# Patient Record
Sex: FEMALE | Race: BLACK OR AFRICAN AMERICAN | NOT HISPANIC OR LATINO | URBAN - METROPOLITAN AREA
[De-identification: names, ages, dates, MRNs, and addresses within clinical notes are randomized per-mention and may not be internally consistent; named-entity substitution may affect disease eponyms.]

---

## 2024-08-19 ENCOUNTER — EMERGENCY (EMERGENCY)
Facility: HOSPITAL | Age: 49
LOS: 1 days | Discharge: HOME | End: 2024-08-19
Attending: STUDENT IN AN ORGANIZED HEALTH CARE EDUCATION/TRAINING PROGRAM | Admitting: EMERGENCY MEDICINE
Payer: COMMERCIAL

## 2024-08-19 VITALS
SYSTOLIC BLOOD PRESSURE: 153 MMHG | WEIGHT: 179.9 LBS | RESPIRATION RATE: 18 BRPM | DIASTOLIC BLOOD PRESSURE: 79 MMHG | HEIGHT: 62 IN | OXYGEN SATURATION: 99 % | HEART RATE: 63 BPM | TEMPERATURE: 98 F

## 2024-08-19 VITALS
OXYGEN SATURATION: 100 % | HEART RATE: 60 BPM | TEMPERATURE: 99 F | DIASTOLIC BLOOD PRESSURE: 79 MMHG | SYSTOLIC BLOOD PRESSURE: 152 MMHG | RESPIRATION RATE: 18 BRPM

## 2024-08-19 LAB
ALBUMIN SERPL ELPH-MCNC: 3.8 G/DL — SIGNIFICANT CHANGE UP (ref 3.3–5)
ALP SERPL-CCNC: 55 U/L — SIGNIFICANT CHANGE UP (ref 40–120)
ALT FLD-CCNC: 15 U/L — SIGNIFICANT CHANGE UP (ref 4–33)
ANION GAP SERPL CALC-SCNC: 14 MMOL/L — SIGNIFICANT CHANGE UP (ref 7–14)
APPEARANCE UR: ABNORMAL
AST SERPL-CCNC: 31 U/L — SIGNIFICANT CHANGE UP (ref 4–32)
BACTERIA # UR AUTO: ABNORMAL /HPF
BASOPHILS # BLD AUTO: 0.02 K/UL — SIGNIFICANT CHANGE UP (ref 0–0.2)
BASOPHILS NFR BLD AUTO: 0.4 % — SIGNIFICANT CHANGE UP (ref 0–2)
BILIRUB SERPL-MCNC: 0.4 MG/DL — SIGNIFICANT CHANGE UP (ref 0.2–1.2)
BILIRUB UR-MCNC: NEGATIVE — SIGNIFICANT CHANGE UP
BUN SERPL-MCNC: 11 MG/DL — SIGNIFICANT CHANGE UP (ref 7–23)
CALCIUM SERPL-MCNC: 9.4 MG/DL — SIGNIFICANT CHANGE UP (ref 8.4–10.5)
CHLORIDE SERPL-SCNC: 102 MMOL/L — SIGNIFICANT CHANGE UP (ref 98–107)
CO2 SERPL-SCNC: 19 MMOL/L — LOW (ref 22–31)
COLOR SPEC: YELLOW — SIGNIFICANT CHANGE UP
CREAT SERPL-MCNC: 0.58 MG/DL — SIGNIFICANT CHANGE UP (ref 0.5–1.3)
DIFF PNL FLD: NEGATIVE — SIGNIFICANT CHANGE UP
EGFR: 111 ML/MIN/1.73M2 — SIGNIFICANT CHANGE UP
EOSINOPHIL # BLD AUTO: 0.06 K/UL — SIGNIFICANT CHANGE UP (ref 0–0.5)
EOSINOPHIL NFR BLD AUTO: 1.1 % — SIGNIFICANT CHANGE UP (ref 0–6)
GLUCOSE SERPL-MCNC: 107 MG/DL — HIGH (ref 70–99)
GLUCOSE UR QL: NEGATIVE MG/DL — SIGNIFICANT CHANGE UP
HCG SERPL-ACNC: <1 MIU/ML — SIGNIFICANT CHANGE UP
HCT VFR BLD CALC: 37.4 % — SIGNIFICANT CHANGE UP (ref 34.5–45)
HGB BLD-MCNC: 12.3 G/DL — SIGNIFICANT CHANGE UP (ref 11.5–15.5)
IANC: 2.38 K/UL — SIGNIFICANT CHANGE UP (ref 1.8–7.4)
IMM GRANULOCYTES NFR BLD AUTO: 0.2 % — SIGNIFICANT CHANGE UP (ref 0–0.9)
KETONES UR-MCNC: NEGATIVE MG/DL — SIGNIFICANT CHANGE UP
LEUKOCYTE ESTERASE UR-ACNC: NEGATIVE — SIGNIFICANT CHANGE UP
LIDOCAIN IGE QN: 25 U/L — SIGNIFICANT CHANGE UP (ref 7–60)
LYMPHOCYTES # BLD AUTO: 2.48 K/UL — SIGNIFICANT CHANGE UP (ref 1–3.3)
LYMPHOCYTES # BLD AUTO: 44.8 % — HIGH (ref 13–44)
MCHC RBC-ENTMCNC: 30.1 PG — SIGNIFICANT CHANGE UP (ref 27–34)
MCHC RBC-ENTMCNC: 32.9 GM/DL — SIGNIFICANT CHANGE UP (ref 32–36)
MCV RBC AUTO: 91.7 FL — SIGNIFICANT CHANGE UP (ref 80–100)
MONOCYTES # BLD AUTO: 0.58 K/UL — SIGNIFICANT CHANGE UP (ref 0–0.9)
MONOCYTES NFR BLD AUTO: 10.5 % — SIGNIFICANT CHANGE UP (ref 2–14)
NEUTROPHILS # BLD AUTO: 2.38 K/UL — SIGNIFICANT CHANGE UP (ref 1.8–7.4)
NEUTROPHILS NFR BLD AUTO: 43 % — SIGNIFICANT CHANGE UP (ref 43–77)
NITRITE UR-MCNC: NEGATIVE — SIGNIFICANT CHANGE UP
NRBC # BLD: 0 /100 WBCS — SIGNIFICANT CHANGE UP (ref 0–0)
NRBC # FLD: 0 K/UL — SIGNIFICANT CHANGE UP (ref 0–0)
PH UR: 7.5 — SIGNIFICANT CHANGE UP (ref 5–8)
PLATELET # BLD AUTO: 307 K/UL — SIGNIFICANT CHANGE UP (ref 150–400)
POTASSIUM SERPL-MCNC: 4.9 MMOL/L — SIGNIFICANT CHANGE UP (ref 3.5–5.3)
POTASSIUM SERPL-SCNC: 4.9 MMOL/L — SIGNIFICANT CHANGE UP (ref 3.5–5.3)
PROT SERPL-MCNC: 7.7 G/DL — SIGNIFICANT CHANGE UP (ref 6–8.3)
PROT UR-MCNC: NEGATIVE MG/DL — SIGNIFICANT CHANGE UP
RBC # BLD: 4.08 M/UL — SIGNIFICANT CHANGE UP (ref 3.8–5.2)
RBC # FLD: 12.6 % — SIGNIFICANT CHANGE UP (ref 10.3–14.5)
RBC CASTS # UR COMP ASSIST: 4 /HPF — SIGNIFICANT CHANGE UP (ref 0–4)
SODIUM SERPL-SCNC: 135 MMOL/L — SIGNIFICANT CHANGE UP (ref 135–145)
SP GR SPEC: 1.01 — SIGNIFICANT CHANGE UP (ref 1–1.03)
SQUAMOUS # UR AUTO: 11 /HPF — HIGH (ref 0–5)
UROBILINOGEN FLD QL: 0.2 MG/DL — SIGNIFICANT CHANGE UP (ref 0.2–1)
WBC # BLD: 5.53 K/UL — SIGNIFICANT CHANGE UP (ref 3.8–10.5)
WBC # FLD AUTO: 5.53 K/UL — SIGNIFICANT CHANGE UP (ref 3.8–10.5)
WBC UR QL: 8 /HPF — HIGH (ref 0–5)

## 2024-08-19 PROCEDURE — 99283 EMERGENCY DEPT VISIT LOW MDM: CPT

## 2024-08-19 PROCEDURE — 99285 EMERGENCY DEPT VISIT HI MDM: CPT

## 2024-08-19 PROCEDURE — 74177 CT ABD & PELVIS W/CONTRAST: CPT | Mod: 26,MC

## 2024-08-19 RX ORDER — KETOROLAC TROMETHAMINE 10 MG
30 TABLET ORAL ONCE
Refills: 0 | Status: DISCONTINUED | OUTPATIENT
Start: 2024-08-19 | End: 2024-08-19

## 2024-08-19 RX ADMIN — Medication 30 MILLIGRAM(S): at 10:40

## 2024-08-19 NOTE — ED PROVIDER NOTE - CLINICAL SUMMARY MEDICAL DECISION MAKING FREE TEXT BOX
Patient's had abdominal pain for years.  On exam she has focal left lower quadrant tenderness palpation.  Vitals are normal no fever well-appearing.  Plan CT abdomen pelvis evaluate for diverticulitis also she had dysuria so we will check urine urine culture.  Anticipate discharge.  She has a GI doctor does not member the name but she said she will follow-up with them.

## 2024-08-19 NOTE — ED PROVIDER NOTE - GASTROINTESTINAL, MLM
difficulty in turns/increased time in double stance/decreased step length/decreased stride length/increased stride width Abdomen soft,  Left lower quadrant tenderness palpation, no guarding.

## 2024-08-19 NOTE — CONSULT NOTE ADULT - ASSESSMENT
Patient is a 48yo  LMP / presenting with 6 years of chronic lower abdominal pain that she experiences on a daily basis, worsens with menses and began shortly after her  section 7 years ago. On evaluation in the ED, patient's vital signs wnl, labs unremarkable and CTAP significant for a 1.6x1.2cm fluid collection in left rectus muscles possibly suggestive of endometrial implant vs abscess for which GYN was consulted.    - No acute surgical intervention indicated at this time.  - Patient to be coordinated for outpatient GYN appointment with MARIA ESTHER ACU:     [facility]  Oncology [facility], [place] [address]  (P) [phone]  (F) [phone]     - At outpatient visit, an MRI may be ordered to better investigate the abdominal wall mass for possible endometriosis implant. Patient also instructed to bring copy of prior CT scan to outpatient appt for comparison.  - Patient encouraged to schedule outpatient GI evaluation  - No GYN contraindication to discharge to home    D/w GYN attending, Dr. Stephanie Ronquillo PGY3

## 2024-08-19 NOTE — CONSULT NOTE ADULT - CONSULT REQUESTED BY NAME
Seek emergent care for trouble breathing, shortness of breath, wheezing, or trouble swallowing.   Monitor blood pressure daily and call/follow up with PCP for BP >155/95.     Take claritin daily.    ED

## 2024-08-19 NOTE — ED PROVIDER NOTE - PROGRESS NOTE DETAILS
Jarad WALLACE: Patient signed out to me. Per GYN, plan for d/c with outpatient follow up with [facility]. Call [phone].

## 2024-08-19 NOTE — ED ADULT NURSE NOTE - OBJECTIVE STATEMENT
Pt awake and alert, denies pertinent PMH presenting to ED for diffuse abdominal pain but slightly worse on left side. Pt states she has had pain on and off for 2 years. Pt has had previous workup with unremarkable findings, Pt well appearing. 20g IV placed in right hand, labs sent. Pt awaiting imaging and dispo.

## 2024-08-19 NOTE — CONSULT NOTE ADULT - SUBJECTIVE AND OBJECTIVE BOX
KIMMY SALDANA  49y  Female [id number]    HPI: Patient is a 50yo  LMP  presenting with 6 years of chronic lower abdominal pain. Pt reports daily abdominal pain that is worsened by movement, laying on her abdomen, bending over. Denies association with meals, denies associated nausea, vomiting, loss of appetite, heartburn, constipation, bloating. She has regular monthly menses and states her pain worsens during menstruation but is still present in between periods. Pain is relieved by OTC medication, often takes Aleve especially during menses. Pt reports this chronic pain began shortly after her  section in . She was worked up in [facility] ED last year and underwent CT scan. Per patient, it was normal. She has the CT results printed out at home, does not have with her in person for review. She follows closely with PCP for T2DM and HTN who is aware of this chronic pain and referred her to GI. Pt temporarily lost her health insurance and has therefore not established care with GI.    Name of GYN Physician: pt cannot recall name  OBHx: C/s x1 (2017), SAB x5  GynHx: Known fibroids, denies cysts, endometriosis, STI's, Abnormal pap smears   PMH: T2DM, HTN  PSH: C/s x1  Meds: Metformin, Irbesartan 75mg  Allx: NKDA  Social History: Denies smoking use, drug use, alcohol use    Vital Signs Last 24 Hrs  T(C): 37.2 (19 Aug 2024 17:11), Max: 37.2 (19 Aug 2024 17:11)  T(F): 98.9 (19 Aug 2024 17:11), Max: 98.9 (19 Aug 2024 17:11)  HR: 60 (19 Aug 2024 17:11) (60 - 63)  BP: 152/79 (19 Aug 2024 17:11) (152/79 - 153/79)  RR: 18 (19 Aug 2024 17:11) (18 - 18)  SpO2: 100% (19 Aug 2024 17:11) (99% - 100%)    Parameters below as of 19 Aug 2024 17:11  Patient On (Oxygen Delivery Method): room air    Physical Exam:   General: sitting comfortably in chair, NAD   HEENT: neck supple, full ROM  CV: extremities well perfused  Lungs: normal respiratory effort on room air  Abd: Soft, nondistended. Mild tenderness to deep palpation of suprapubic region in midline and patient right. No rebound tenderness. No palpable masses.  Ext: non-tender b/l, no edema     LABS:                       12.3   5.53  )-----------( 307      ( 19 Aug 2024 10:27 )             37.4         135  |  102  |  11  ----------------------------<  107<H>  4.9   |  19<L>  |  0.58    Ca    9.4      19 Aug 2024 10:27    TPro  7.7  /  Alb  3.8  /  TBili  0.4  /  DBili  x   /  AST  31  /  ALT  15  /  AlkPhos  55      I&O's Detail    Urinalysis Basic - ( 19 Aug 2024 10:27 )    Color: Yellow / Appearance: Cloudy / S.013 / pH: x  Gluc: 107 mg/dL / Ketone: Negative mg/dL  / Bili: Negative / Urobili: 0.2 mg/dL   Blood: x / Protein: Negative mg/dL / Nitrite: Negative   Leuk Esterase: Negative / RBC: 4 /HPF / WBC 8 /HPF   Sq Epi: x / Non Sq Epi: 11 /HPF / Bacteria: Many /HPF    RADIOLOGY & ADDITIONAL STUDIES:  < from: CT Abdomen and Pelvis w/ IV Cont (24 @ 11:16) >  ACC: [id number] EXAM:  CT ABDOMEN AND PELVIS IC   ORDERED BY: AKIL LOZOYA     PROCEDURE DATE:  2024      INTERPRETATION:  CLINICAL INFORMATION: Left lower quadrant tenderness.   Concern for diverticulosis. Surgical history . Says she started   getting abdominal pain after her  years ago    COMPARISON: None.    CONTRAST/COMPLICATIONS:  IV Contrast: Omnipaque 350  90 cc administered   10 cc discarded  Oral Contrast: NONE  Complications: None reported at time of study completion    PROCEDURE:  CT of the Abdomen and Pelvis was performed.  Sagittal and coronal reformats were performed.    FINDINGS:  LOWER CHEST: Within normal limits.    LIVER: Within normal limits.  BILE DUCTS: Normal caliber.  GALLBLADDER: Withinnormal limits.  SPLEEN: Within normal limits.  PANCREAS: Within normal limits.  ADRENALS: Within normal limits.  KIDNEYS/URETERS: Within normal limits.    BLADDER: Within normal limits.  REPRODUCTIVE ORGANS: Leiomyomatous uterus.    BOWEL: No bowel obstruction. Appendix is normal. Diverticulosis without   evidence of diverticulitis.  PERITONEUM/RETROPERITONEUM: Within normal limits.  VESSELS: Within normal limits.  LYMPH NODES: No lymphadenopathy.  ABDOMINAL WALL: Inflammatory changes along the  scar. A 1.6 x   1.2 cm fluid collection in the left rectus.  BONES: Within normal limits.    IMPRESSION:  Inflammatory changes along the  scar. A 1.6 x 1.2 cm fluid   collection in the left rectus, possibly endometrial implant or abscess.    --- End of Report ---    MARGOT JUAREZ MD; Resident Radiologist  This document has been electronically signed.  ERIC BELTRÁN MD; Attending Radiologist  This document has been electronically signed. Aug 19 2024  1:04PM    < end of copied text >

## 2024-08-19 NOTE — ED PROVIDER NOTE - NSFOLLOWUPINSTRUCTIONS_ED_ALL_ED_FT
You were seen here in the emergency department for evaluation of abdominal pain.  Your workup included blood work and a CT scan.  Your CT scan showed:      IMPRESSION:  Inflammatory changes along the  scar. A 1.6 x 1.2 cm fluid   collection in the left rectus, possibly endometrial implant or abscess.     You were evaluated by OB/GYN.  The recommendation is that you follow-up in clinic.  The clinic information is as follows:    [facility]  [address] [place] [address], [place] [address] [phone]     If you develop new symptoms of concern such as heavy bleeding, fevers, worsening abdominal pain, return to the emergency department for evaluation.  Otherwise, please follow-up with the clinic as discussed.  Please bring all prior imaging as requested.

## 2024-08-19 NOTE — ED PROVIDER NOTE - PATIENT PORTAL LINK FT
You can access the FollowMyHealth Patient Portal offered by [facility] by registering at the following website: [email or web address]. By joining [employer]’s FollowMyHealth portal, you will also be able to view your health information using other applications (apps) compatible with our system.

## 2024-08-19 NOTE — ED PROVIDER NOTE - OBJECTIVE STATEMENT
49-year-old female presents to the ED with years of bilateral and also diffuse abdominal pain worsening this morning.  Nonradiating.  Associated with dysuria.   No nausea vomiting or diarrhea.  She is occasionally constipated.  She said drinking milk makes her feel worse.  She was evaluated for this once before and on the emergency room which did not yield diagnosis.  She denies fevers chills.  Says Tylenol does not work for the pain but Advil helps her.  Surgical history .  Says she started getting abdominal pain after her  years ago.  Non-smoker nondrinker.  Medical history diabetes and hypertension on metformin.

## 2024-09-03 ENCOUNTER — RESULT REVIEW (OUTPATIENT)
Age: 49
End: 2024-09-03

## 2024-09-04 ENCOUNTER — OUTPATIENT (OUTPATIENT)
Dept: OUTPATIENT SERVICES | Facility: HOSPITAL | Age: 49
LOS: 1 days | End: 2024-09-04

## 2024-09-04 ENCOUNTER — APPOINTMENT (OUTPATIENT)
Dept: OBGYN | Facility: HOSPITAL | Age: 49
End: 2024-09-04
Payer: COMMERCIAL

## 2024-09-04 VITALS
WEIGHT: 187.6 LBS | SYSTOLIC BLOOD PRESSURE: 130 MMHG | DIASTOLIC BLOOD PRESSURE: 81 MMHG | HEART RATE: 62 BPM | TEMPERATURE: 97.8 F | BODY MASS INDEX: 33.24 KG/M2 | HEIGHT: 63 IN

## 2024-09-04 DIAGNOSIS — R10.9 UNSPECIFIED ABDOMINAL PAIN: ICD-10-CM

## 2024-09-04 DIAGNOSIS — N80.03 ADENOMYOSIS OF THE UTERUS: ICD-10-CM

## 2024-09-04 PROCEDURE — 99213 OFFICE O/P EST LOW 20 MIN: CPT | Mod: GC

## 2024-09-04 RX ORDER — NORETHINDRONE ACETATE 5 MG/1
5 TABLET ORAL DAILY
Qty: 28 | Refills: 3 | Status: ACTIVE | COMMUNITY
Start: 2024-09-04 | End: 1900-01-01

## 2024-09-05 DIAGNOSIS — N80.03 ADENOMYOSIS OF THE UTERUS: ICD-10-CM

## 2024-09-05 DIAGNOSIS — R10.9 UNSPECIFIED ABDOMINAL PAIN: ICD-10-CM

## 2024-09-05 LAB — HPV HIGH+LOW RISK DNA PNL CVX: SIGNIFICANT CHANGE UP

## 2024-09-07 LAB — CYTOLOGY SPEC DOC CYTO: SIGNIFICANT CHANGE UP

## 2024-09-11 NOTE — HISTORY OF PRESENT ILLNESS
[FreeTextEntry1] : HPI: Pt is a 49y  LMP  presenting to establish care in setting of chronic abdominal pain. Pt was seen in [facility] ED on . CT at that time demonstrated inflammatory C/S scar changes as well as 1.6x1.2cm L rectus fluid collection, endometrial implant vs abscess.  Today pt reports her pain is about the same. Describes the abdominal pain as intermittent cramping, radiates throughout lower abdomen b/l, rates pain as 7-8/10 at worst. Pt reports the pain is exacerbated by menses (but present even when she is not menstruating) and exertion. Reports she does not like taking medication for pain but occasionally will take an Aleve with some relief. Pt reports the pain has been present for "a long time," at least since her C/S in . Pt reports occasional urinary urgency but denies dysuria or incontinence. Denies constipation, fevers, chills, n/v, intermenstrual vaginal bleeding, vaginal discharge, dyspareunia. Pt reports she last saw a GYN approx 1y ago (pt unsure of name of physician) and reports being told everything was "normal."  OBHx: 2017- pLTCS (pt reports 2/2 PEC), SAB x5 GYNHx: reports h/o adenomyosis and small fibroids. Reports irregular menses (no menses for 3 months approx 1y ago but regular since that time), reports menses last 4d and require 3-4 pads/d. Denies h/o cysts, abnormal Paps, STIs. Sexually active and not using contraception PMH: T2DM, HTN PSH: C/S x1 Meds: Irbesartan, Metformin NKDA  HCM: Pap- several years ago and wnl per pt Mammogram- within the year and wnl per pt Colonoscopy- never per pt

## 2024-09-11 NOTE — HISTORY OF PRESENT ILLNESS
[FreeTextEntry1] : HPI: Pt is a 49y  LMP  presenting to establish care in setting of chronic abdominal pain. Pt was seen in [facility] ED on . CT at that time demonstrated inflammatory C/S scar changes as well as 1.6x1.2cm L rectus fluid collection, endometrial implant vs abscess.  Today pt reports her pain is about the same. Describes the abdominal pain as intermittent cramping, radiates throughout lower abdomen b/l, rates pain as 7-8/10 at worst. Pt reports the pain is exacerbated by menses (but present even when she is not menstruating) and exertion. Reports she does not like taking medication for pain but occasionally will take an Aleve with some relief. Pt reports the pain has been present for "a long time," at least since her C/S in . Pt reports occasional urinary urgency but denies dysuria or incontinence. Denies constipation, fevers, chills, n/v, intermenstrual vaginal bleeding, vaginal discharge, dyspareunia. Pt reports she last saw a GYN approx 1y ago (pt unsure of name of physician) and reports being told everything was "normal."  OBHx: 2017- pLTCS (pt reports 2/2 PEC), SAB x5 GYNHx: reports h/o adenomyosis and small fibroids. Reports irregular menses (no menses for 3 months approx 1y ago but regular since that time), reports menses last 4d and require 3-4 pads/d. Denies h/o cysts, abnormal Paps, STIs. Sexually active and not using contraception PMH: T2DM, HTN PSH: C/S x1 Meds: Irbesartan, Metformin NKDA  HCM: Pap- several years ago and wnl per pt Mammogram- within the year and wnl per pt Colonoscopy- never per pt

## 2024-09-11 NOTE — PHYSICAL EXAM
[Chaperone Present] : A chaperone was present in the examining room during all aspects of the physical examination [Appropriately responsive] : appropriately responsive [Alert] : alert [No Acute Distress] : no acute distress [Soft] : soft [Non-distended] : non-distended [No Lesions] : no lesions [Oriented x3] : oriented x3 [Labia Majora] : normal [Normal] : normal [FreeTextEntry7] : +Mild tenderness to palpation over suprapubic region and b/l lower quadrants without rebound or guarding. Pfannenstiel incision well healed. [FreeTextEntry6] : Difficult to palpate fundus 2/2 body habitus

## 2024-09-11 NOTE — HISTORY OF PRESENT ILLNESS
[FreeTextEntry1] : HPI: Pt is a 49y  LMP  presenting to establish care in setting of chronic abdominal pain. Pt was seen in [facility] ED on . CT at that time demonstrated inflammatory C/S scar changes as well as 1.6x1.2cm L rectus fluid collection, endometrial implant vs abscess.  Today pt reports her pain is about the same. Describes the abdominal pain as intermittent cramping, radiates throughout lower abdomen b/l, rates pain as 7-8/10 at worst. Pt reports the pain is exacerbated by menses (but present even when she is not menstruating) and exertion. Reports she does not like taking medication for pain but occasionally will take an Aleve with some relief. Pt reports the pain has been present for "a long time," at least since her C/S in . Pt reports occasional urinary urgency but denies dysuria or incontinence. Denies constipation, fevers, chills, n/v, intermenstrual vaginal bleeding, vaginal discharge, dyspareunia. Pt reports she last saw a GYN approx 1y ago (pt unsure of name of physician) and reports being told everything was "normal."  OBHx: 2017- pLTCS (pt reports 2/2 PEC), SAB x5 GYNHx: reports h/o adenomyosis and small fibroids. Reports irregular menses (no menses for 3 months approx 1y ago but regular since that time), reports menses last 4d and require 3-4 pads/d. Denies h/o cysts, abnormal Paps, STIs. Sexually active and not using contraception PMH: T2DM, HTN PSH: C/S x1 Meds: Irbesartan, Metformin NKDA  HCM: Pap- several years ago and wnl per pt Mammogram- within the year and wnl per pt Colonoscopy- never per pt

## 2024-09-11 NOTE — PLAN
[FreeTextEntry1] : A/P: Pt is a 49y  LMP  presenting to establish care in setting of chronic abdominal pain. Pt with outside medical records from [facility] reporting h/o adenomyosis. Pt also reporting h/o small fibroids. CT demonstrating L rectus endometrial implant vs abscess. Pt's clinical presentation c/f adenomyosis, r/o endometriosis vs non-gynecologic pathology.  #Chronic abdominal pain, adenomyosis +/- endometriosis -Discussed differential dx with pt. Discussed definitive surgical mgmt via hysterectomy vs medical mgmt with Aygestin (would avoid OCPs given h/o HTN and pt's age) vs Lupron vs alternative surgical mgmt with endometrial ablation vs uterine artery embolization. Pt opting to proceed with medical mgmt with Aygestin at this time. Questions answered at bedside -Will trial Aygestin 5mg qd x3mo -CT findings as above -MRI A/P w/wo IV cont referral given for further characterization of possible endometriotic implant -Consider gen surg referral pending MRI read  #HCM -Pap/HPV sent today -Mammo up to date per pt -Colonoscopy referral placed  RTC in 3mo or PRN  Seen and evaluated with Dr. Roach, BOOKER fellow and d/w BOOKER Reardon attending [facility] PGY3  MIGS Fellow Addendum: 49y  presenting as consultation for CPP/AUB, exacerbated by menses. She was seen in the [facility] ED 2 weeks ago with CT scan showing inflammatory C/S scar changes as well as 1.6x1.2cm L rectus fluid collection, endometrial implant vs abscess. Outside records indicate possible history of adenomyosis. PMSH significant for 1x LTCS and 5x SAB, T2DM and HTN. Physical exam limited due to body habitus. In light of concern for adenomyosis, endometriosis and endometrial implant on anterior abdominal wall, MRI ordered to better characterize pelvic structures and lesions. Lengthy conversation had with patient as she states she desires future fertility but is 49 years old with a history of RPL and no egg-retrievals or LIONEL consultation- discussed that it would be very unlikely for her to conceive naturally. Options for CPP/AUB in light of adenomyosis/endometriosis reviewed including hormonal medical management (progesterone-only options given pt's age and HTN), UAE, ablation, surgery with TLH +/- BSO. At this time patient would like to trial Aygestin 5mg daily. RTO in 3 months for FUV.  Jory Roach MD PGY-6, FMIGS

## 2024-09-11 NOTE — PLAN
[FreeTextEntry1] : A/P: Pt is a 49y  LMP  presenting to establish care in setting of chronic abdominal pain. Pt with outside medical records from [facility] reporting h/o adenomyosis. Pt also reporting h/o small fibroids. CT demonstrating L rectus endometrial implant vs abscess. Pt's clinical presentation c/f adenomyosis, r/o endometriosis vs non-gynecologic pathology.  #Chronic abdominal pain, adenomyosis +/- endometriosis -Discussed differential dx with pt. Discussed definitive surgical mgmt via hysterectomy vs medical mgmt with Aygestin (would avoid OCPs given h/o HTN and pt's age) vs Lupron vs alternative surgical mgmt with endometrial ablation vs uterine artery embolization. Pt opting to proceed with medical mgmt with Aygestin at this time. Questions answered at bedside -Will trial Aygestin 5mg qd x3mo -CT findings as above -MRI A/P w/wo IV cont referral given for further characterization of possible endometriotic implant -Consider gen surg referral pending MRI read  #HCM -Pap/HPV sent today -Mammo up to date per pt -Colonoscopy referral placed  RTC in 3mo or PRN  Seen and evaluated with Dr. Roach, BOOKER fellow and d/w BOOKER Reardon attending [facility] PGY3  MIGS Fellow Addendum: 49y  presenting as consultation for CPP/AUB, exacerbated by menses. She was seen in the [facility] ED 2 weeks ago with CT scan showing inflammatory C/S scar changes as well as 1.6x1.2cm L rectus fluid collection, endometrial implant vs abscess. Outside records indicate possible history of adenomyosis. PMSH significant for 1x LTCS and 5x SAB, T2DM and HTN. Physical exam limited due to body habitus. In light of concern for adenomyosis, endometriosis and endometrial implant on anterior abdominal wall, MRI ordered to better characterize pelvic structures and lesions. Lengthy conversation had with patient as she states she desires future fertility but is 49 years old with a history of RPL and no egg-retrievals or LIONEL consultation- discussed that it would be very unlikely for her to conceive naturally. Options for CPP/AUB in light of adenomyosis/endometriosis reviewed including hormonal medical management (progesterone-only options given pt's age and HTN), UAE, ablation, surgery with TLH +/- BSO. At this time patient would like to trial Aygestin 5mg daily. RTO in 3 months for FUV.  Jory Roach MD PGY-6, FMIGS

## 2024-09-11 NOTE — HISTORY OF PRESENT ILLNESS
[FreeTextEntry1] : HPI: Pt is a 49y  LMP  presenting to establish care in setting of chronic abdominal pain. Pt was seen in [facility] ED on . CT at that time demonstrated inflammatory C/S scar changes as well as 1.6x1.2cm L rectus fluid collection, endometrial implant vs abscess.  Today pt reports her pain is about the same. Describes the abdominal pain as intermittent cramping, radiates throughout lower abdomen b/l, rates pain as 7-8/10 at worst. Pt reports the pain is exacerbated by menses (but present even when she is not menstruating) and exertion. Reports she does not like taking medication for pain but occasionally will take an Aleve with some relief. Pt reports the pain has been present for "a long time," at least since her C/S in . Pt reports occasional urinary urgency but denies dysuria or incontinence. Denies constipation, fevers, chills, n/v, intermenstrual vaginal bleeding, vaginal discharge, dyspareunia. Pt reports she last saw a GYN approx 1y ago (pt unsure of name of physician) and reports being told everything was "normal."  OBHx: 2017- pLTCS (pt reports 2/2 PEC), SAB x5 GYNHx: reports h/o adenomyosis and small fibroids. Reports irregular menses (no menses for 3 months approx 1y ago but regular since that time), reports menses last 4d and require 3-4 pads/d. Denies h/o cysts, abnormal Paps, STIs. Sexually active and not using contraception PMH: T2DM, HTN PSH: C/S x1 Meds: Irbesartan, Metformin NKDA  HCM: Pap- several years ago and wnl per pt Mammogram- within the year and wnl per pt Colonoscopy- never per pt

## 2024-09-11 NOTE — HISTORY OF PRESENT ILLNESS
[FreeTextEntry1] : HPI: Pt is a 49y  LMP  presenting to establish care in setting of chronic abdominal pain. Pt was seen in [facility] ED on . CT at that time demonstrated inflammatory C/S scar changes as well as 1.6x1.2cm L rectus fluid collection, endometrial implant vs abscess.  Today pt reports her pain is about the same. Describes the abdominal pain as intermittent cramping, radiates throughout lower abdomen b/l, rates pain as 7-8/10 at worst. Pt reports the pain is exacerbated by menses (but present even when she is not menstruating) and exertion. Reports she does not like taking medication for pain but occasionally will take an Aleve with some relief. Pt reports the pain has been present for "a long time," at least since her C/S in . Pt reports occasional urinary urgency but denies dysuria or incontinence. Denies constipation, fevers, chills, n/v, intermenstrual vaginal bleeding, vaginal discharge, dyspareunia. Pt reports she last saw a GYN approx 1y ago (pt unsure of name of physician) and reports being told everything was "normal."  OBHx: 2017- pLTCS (pt reports 2/2 PEC), SAB x5 GYNHx: reports h/o adenomyosis and small fibroids. Reports irregular menses (no menses for 3 months approx 1y ago but regular since that time), reports menses last 4d and require 3-4 pads/d. Denies h/o cysts, abnormal Paps, STIs. Sexually active and not using contraception PMH: T2DM, HTN PSH: C/S x1 Meds: Irbesartan, Metformin NKDA  HCM: Pap- several years ago and wnl per pt Mammogram- within the year and wnl per pt Colonoscopy- never per pt

## 2024-09-11 NOTE — PLAN
[FreeTextEntry1] : A/P: Pt is a 49y  LMP  presenting to establish care in setting of chronic abdominal pain. Pt with outside medical records from [facility] reporting h/o adenomyosis. Pt also reporting h/o small fibroids. CT demonstrating L rectus endometrial implant vs abscess. Pt's clinical presentation c/f adenomyosis, r/o endometriosis vs non-gynecologic pathology.  #Chronic abdominal pain, adenomyosis +/- endometriosis -Discussed differential dx with pt. Discussed definitive surgical mgmt via hysterectomy vs medical mgmt with Aygestin (would avoid OCPs given h/o HTN and pt's age) vs Lupron vs alternative surgical mgmt with endometrial ablation vs uterine artery embolization. Pt opting to proceed with medical mgmt with Aygestin at this time. Questions answered at bedside -Will trial Aygestin 5mg qd x3mo -CT findings as above -MRI A/P w/wo IV cont referral given for further characterization of possible endometriotic implant -Consider gen surg referral pending MRI read  #HCM -Pap/HPV sent today -Mammo up to date per pt -Colonoscopy referral placed  RTC in 3mo or PRN  Seen and evaluated with Dr. Roach, BOOKER fellow and d/w BOOKER Reardon attending [facility] PGY3  [employer] Fellow Addendum: 49y  presenting as consultation for CPP/AUB, exacerbated by menses. She was seen in the [facility] ED 2 weeks ago with CT scan showing inflammatory C/S scar changes as well as 1.6x1.2cm L rectus fluid collection, endometrial implant vs abscess. Outside records indicate possible history of adenomyosis. PMSH significant for 1x LTCS and 5x SAB, T2DM and HTN. Physical exam limited due to body habitus. In light of concern for adenomyosis, endometriosis and endometrial implant on anterior abdominal wall, MRI ordered to better characterize pelvic structures and lesions. Lengthy conversation had with patient as she states she desires future fertility but is 49 years old with a history of RPL and no egg-retrievals or LIONEL consultation- discussed that it would be very unlikely for her to conceive naturally. Options for CPP/AUB in light of adenomyosis/endometriosis reviewed including hormonal medical management (progesterone-only options given pt's age and HTN), UAE, ablation, surgery with TLH +/- BSO. At this time patient would like to trial Aygestin 5mg daily. RTO in 3 months for FUV.  Jory Roach MD PGY-6, [facility]

## 2024-09-11 NOTE — PLAN
[FreeTextEntry1] : A/P: Pt is a 49y  LMP  presenting to establish care in setting of chronic abdominal pain. Pt with outside medical records from [facility] reporting h/o adenomyosis. Pt also reporting h/o small fibroids. CT demonstrating L rectus endometrial implant vs abscess. Pt's clinical presentation c/f adenomyosis, r/o endometriosis vs non-gynecologic pathology.  #Chronic abdominal pain, adenomyosis +/- endometriosis -Discussed differential dx with pt. Discussed definitive surgical mgmt via hysterectomy vs medical mgmt with Aygestin (would avoid OCPs given h/o HTN and pt's age) vs Lupron vs alternative surgical mgmt with endometrial ablation vs uterine artery embolization. Pt opting to proceed with medical mgmt with Aygestin at this time. Questions answered at bedside -Will trial Aygestin 5mg qd x3mo -CT findings as above -MRI A/P w/wo IV cont referral given for further characterization of possible endometriotic implant -Consider gen surg referral pending MRI read  #HCM -Pap/HPV sent today -Mammo up to date per pt -Colonoscopy referral placed  RTC in 3mo or PRN  Seen and evaluated with Dr. Roach, BOOKER fellow and d/w BOOKER Reardon attending [facility] PGY3  MIGS Fellow Addendum: 49y  presenting as consultation for CPP/AUB, exacerbated by menses. She was seen in the [facility] ED 2 weeks ago with CT scan showing inflammatory C/S scar changes as well as 1.6x1.2cm L rectus fluid collection, endometrial implant vs abscess. Outside records indicate possible history of adenomyosis. PMSH significant for 1x LTCS and 5x SAB, T2DM and HTN. Physical exam limited due to body habitus. In light of concern for adenomyosis, endometriosis and endometrial implant on anterior abdominal wall, MRI ordered to better characterize pelvic structures and lesions. Lengthy conversation had with patient as she states she desires future fertility but is 49 years old with a history of RPL and no egg-retrievals or LIONEL consultation- discussed that it would be very unlikely for her to conceive naturally. Options for CPP/AUB in light of adenomyosis/endometriosis reviewed including hormonal medical management (progesterone-only options given pt's age and HTN), UAE, ablation, surgery with TLH +/- BSO. At this time patient would like to trial Aygestin 5mg daily. RTO in 3 months for FUV.  Jory Roach MD PGY-6, [facility]

## 2024-09-11 NOTE — PLAN
[FreeTextEntry1] : A/P: Pt is a 49y  LMP  presenting to establish care in setting of chronic abdominal pain. Pt with outside medical records from [facility] reporting h/o adenomyosis. Pt also reporting h/o small fibroids. CT demonstrating L rectus endometrial implant vs abscess. Pt's clinical presentation c/f adenomyosis, r/o endometriosis vs non-gynecologic pathology.  #Chronic abdominal pain, adenomyosis +/- endometriosis -Discussed differential dx with pt. Discussed definitive surgical mgmt via hysterectomy vs medical mgmt with Aygestin (would avoid OCPs given h/o HTN and pt's age) vs Lupron vs alternative surgical mgmt with endometrial ablation vs uterine artery embolization. Pt opting to proceed with medical mgmt with Aygestin at this time. Questions answered at bedside -Will trial Aygestin 5mg qd x3mo -CT findings as above -MRI A/P w/wo IV cont referral given for further characterization of possible endometriotic implant -Consider gen surg referral pending MRI read  #HCM -Pap/HPV sent today -Mammo up to date per pt -Colonoscopy referral placed  RTC in 3mo or PRN  Seen and evaluated with Dr. Roach, BOOKER fellow and d/w BOOKER Reardon attending [facility] PGY3  MIGS Fellow Addendum: 49y  presenting as consultation for CPP/AUB, exacerbated by menses. She was seen in the [facility] ED 2 weeks ago with CT scan showing inflammatory C/S scar changes as well as 1.6x1.2cm L rectus fluid collection, endometrial implant vs abscess. Outside records indicate possible history of adenomyosis. PMSH significant for 1x LTCS and 5x SAB, T2DM and HTN. Physical exam limited due to body habitus. In light of concern for adenomyosis, endometriosis and endometrial implant on anterior abdominal wall, MRI ordered to better characterize pelvic structures and lesions. Lengthy conversation had with patient as she states she desires future fertility but is 49 years old with a history of RPL and no egg-retrievals or LIONEL consultation- discussed that it would be very unlikely for her to conceive naturally. Options for CPP/AUB in light of adenomyosis/endometriosis reviewed including hormonal medical management (progesterone-only options given pt's age and HTN), UAE, ablation, surgery with TLH +/- BSO. At this time patient would like to trial Aygestin 5mg daily. RTO in 3 months for FUV.  Jory Roach MD PGY-6, [facility]S

## 2024-12-04 ENCOUNTER — OUTPATIENT (OUTPATIENT)
Dept: OUTPATIENT SERVICES | Facility: HOSPITAL | Age: 49
LOS: 1 days | End: 2024-12-04

## 2024-12-04 ENCOUNTER — APPOINTMENT (OUTPATIENT)
Dept: OBGYN | Facility: HOSPITAL | Age: 49
End: 2024-12-04

## 2024-12-04 VITALS
WEIGHT: 183 LBS | BODY MASS INDEX: 32.43 KG/M2 | TEMPERATURE: 97.5 F | DIASTOLIC BLOOD PRESSURE: 77 MMHG | HEIGHT: 63 IN | HEART RATE: 67 BPM | SYSTOLIC BLOOD PRESSURE: 132 MMHG

## 2024-12-04 DIAGNOSIS — G89.29 PELVIC AND PERINEAL PAIN: ICD-10-CM

## 2024-12-04 DIAGNOSIS — R10.2 PELVIC AND PERINEAL PAIN: ICD-10-CM

## 2024-12-04 PROCEDURE — 99213 OFFICE O/P EST LOW 20 MIN: CPT | Mod: GC

## 2024-12-05 PROBLEM — R10.2 CHRONIC PELVIC PAIN IN FEMALE: Status: ACTIVE | Noted: 2024-12-05

## 2024-12-06 DIAGNOSIS — R10.2 PELVIC AND PERINEAL PAIN: ICD-10-CM

## 2024-12-16 ENCOUNTER — APPOINTMENT (OUTPATIENT)
Dept: MRI IMAGING | Facility: IMAGING CENTER | Age: 49
End: 2024-12-16
Payer: COMMERCIAL

## 2024-12-16 ENCOUNTER — OUTPATIENT (OUTPATIENT)
Dept: OUTPATIENT SERVICES | Facility: HOSPITAL | Age: 49
LOS: 1 days | End: 2024-12-16
Payer: COMMERCIAL

## 2024-12-16 PROCEDURE — A9585: CPT

## 2024-12-16 PROCEDURE — 72197 MRI PELVIS W/O & W/DYE: CPT

## 2024-12-16 PROCEDURE — 72197 MRI PELVIS W/O & W/DYE: CPT | Mod: 26

## 2024-12-19 ENCOUNTER — NON-APPOINTMENT (OUTPATIENT)
Age: 49
End: 2024-12-19

## 2025-03-05 ENCOUNTER — OUTPATIENT (OUTPATIENT)
Dept: OUTPATIENT SERVICES | Facility: HOSPITAL | Age: 50
LOS: 1 days | End: 2025-03-05

## 2025-03-05 ENCOUNTER — APPOINTMENT (OUTPATIENT)
Dept: OBGYN | Facility: HOSPITAL | Age: 50
End: 2025-03-05

## 2025-03-05 VITALS
WEIGHT: 182 LBS | BODY MASS INDEX: 32.25 KG/M2 | HEIGHT: 63 IN | DIASTOLIC BLOOD PRESSURE: 84 MMHG | SYSTOLIC BLOOD PRESSURE: 142 MMHG | TEMPERATURE: 97.4 F | HEART RATE: 73 BPM

## 2025-03-05 PROCEDURE — 99213 OFFICE O/P EST LOW 20 MIN: CPT | Mod: GC

## 2025-03-06 DIAGNOSIS — N80.03 ADENOMYOSIS OF THE UTERUS: ICD-10-CM

## 2025-03-06 DIAGNOSIS — N80.9 ENDOMETRIOSIS, UNSPECIFIED: ICD-10-CM

## 2025-03-06 DIAGNOSIS — N93.9 ABNORMAL UTERINE AND VAGINAL BLEEDING, UNSPECIFIED: ICD-10-CM

## 2025-03-17 ENCOUNTER — NON-APPOINTMENT (OUTPATIENT)
Age: 50
End: 2025-03-17

## 2025-04-02 ENCOUNTER — OUTPATIENT (OUTPATIENT)
Dept: OUTPATIENT SERVICES | Facility: HOSPITAL | Age: 50
LOS: 1 days | End: 2025-04-02

## 2025-04-02 ENCOUNTER — APPOINTMENT (OUTPATIENT)
Dept: OBGYN | Facility: HOSPITAL | Age: 50
End: 2025-04-02
Payer: COMMERCIAL

## 2025-04-02 VITALS
SYSTOLIC BLOOD PRESSURE: 144 MMHG | BODY MASS INDEX: 32.25 KG/M2 | DIASTOLIC BLOOD PRESSURE: 86 MMHG | WEIGHT: 182 LBS | TEMPERATURE: 97.3 F | HEIGHT: 63 IN | HEART RATE: 59 BPM

## 2025-04-02 DIAGNOSIS — G89.29 PELVIC AND PERINEAL PAIN: ICD-10-CM

## 2025-04-02 DIAGNOSIS — E66.3 OVERWEIGHT: ICD-10-CM

## 2025-04-02 DIAGNOSIS — N80.03 ADENOMYOSIS OF THE UTERUS: ICD-10-CM

## 2025-04-02 DIAGNOSIS — R10.2 PELVIC AND PERINEAL PAIN: ICD-10-CM

## 2025-04-02 PROCEDURE — 99213 OFFICE O/P EST LOW 20 MIN: CPT | Mod: GC

## 2025-04-02 RX ORDER — NORETHINDRONE ACETATE 5 MG/1
5 TABLET ORAL
Qty: 28 | Refills: 3 | Status: ACTIVE | COMMUNITY
Start: 2025-04-02 | End: 1900-01-01

## 2025-04-03 DIAGNOSIS — R10.2 PELVIC AND PERINEAL PAIN: ICD-10-CM

## 2025-04-03 DIAGNOSIS — N80.03 ADENOMYOSIS OF THE UTERUS: ICD-10-CM

## 2025-04-03 DIAGNOSIS — E66.3 OVERWEIGHT: ICD-10-CM
